# Patient Record
Sex: FEMALE | Race: WHITE | NOT HISPANIC OR LATINO | URBAN - METROPOLITAN AREA
[De-identification: names, ages, dates, MRNs, and addresses within clinical notes are randomized per-mention and may not be internally consistent; named-entity substitution may affect disease eponyms.]

---

## 2017-11-15 ENCOUNTER — OFFICE (OUTPATIENT)
Dept: URBAN - METROPOLITAN AREA CLINIC 10 | Facility: CLINIC | Age: 77
End: 2017-11-15
Payer: MEDICAID

## 2017-11-15 VITALS
SYSTOLIC BLOOD PRESSURE: 136 MMHG | DIASTOLIC BLOOD PRESSURE: 53 MMHG | WEIGHT: 245 LBS | HEART RATE: 64 BPM | HEIGHT: 65 IN

## 2017-11-15 DIAGNOSIS — R68.81 EARLY SATIETY: ICD-10-CM

## 2017-11-15 DIAGNOSIS — R10.13 EPIGASTRIC PAIN: ICD-10-CM

## 2017-11-15 DIAGNOSIS — R11.0 NAUSEA: ICD-10-CM

## 2017-11-15 DIAGNOSIS — R11.10 VOMITING, UNSPECIFIED: ICD-10-CM

## 2017-11-15 DIAGNOSIS — R10.84 GENERALIZED ABDOMINAL PAIN: ICD-10-CM

## 2017-11-15 PROCEDURE — 99204 OFFICE O/P NEW MOD 45 MIN: CPT | Performed by: INTERNAL MEDICINE

## 2017-11-15 PROCEDURE — G8427 DOCREV CUR MEDS BY ELIG CLIN: HCPCS | Performed by: INTERNAL MEDICINE

## 2017-11-15 RX ORDER — ONDANSETRON 4 MG/1
TABLET, ORALLY DISINTEGRATING ORAL
Qty: 30 | Refills: 1 | Status: ACTIVE
Start: 2017-11-15

## 2017-11-15 NOTE — SERVICENOTES
Explain given her recent normal endoscopy and her poor overall medical condition would not consider her a very good candidate for endoscopy at this point.  Will obtain records from [place].  Based on records from [place] will consider gastric emptying scan versus esophagram with upper GI.

## 2017-11-15 NOTE — SERVICEHPINOTES
Ms. Rey is 77 years old. She is her in consultation with Dr. Oliveros with chief complaint of nausea and vomiting that has been for 6 months. She describes her "stomach beign sore" as she point to he epigastric area. She describes having similar symptoms about one year ago with a negative workup in [place] about one year ago. She describes a 25 pound weight loss over the past 2 months. She describes her epigastric pain as twisting that will lead to nausea followed by emesis that can be what she as eaten several hours ago. She describes post-prandial nausea. She describes nausea prior to eating as well. She also complains of pre-syncope with standing. She describes falling at home a great deal due to the pre-syncope. She has a history of colon cancer s/p resection that was 30 years ago. Her last colonoscopy was 3 years ago. She states she is taking reglan and has done so for several years however, it is not one of her medicines she brought with her. She takes lortab daily. She is also with a pain patch.  She is wearing oxygen and in a wheel chair. She is wearing O2 2/2 COPD. She describes periodic jerking movement in her arms. She describes 2 pillow orthopnea. She describes early satiety. She has a history of CHF and DM. She describes A1c as being 6 at last check. She thinks she has peripheral neuropathy. Review of records from Dr. Oliveros:BR-10/2017-CTA: No renal artery stenosis, widely patent abdominal aorta with no aneurysmal dilation or stenosisBR-appears she had extensive GI workup approximately 1 year ago (09/2016) with EGD and Bravo exam. Manometry as well as gastric emptying scan.BR-she is on a PPI b.i.d.

## 2018-02-06 ENCOUNTER — OFFICE (OUTPATIENT)
Dept: URBAN - METROPOLITAN AREA CLINIC 10 | Facility: CLINIC | Age: 78
End: 2018-02-06
Payer: MEDICAID

## 2018-02-06 VITALS
WEIGHT: 236 LBS | HEIGHT: 65 IN | DIASTOLIC BLOOD PRESSURE: 52 MMHG | SYSTOLIC BLOOD PRESSURE: 130 MMHG | HEART RATE: 73 BPM

## 2018-02-06 DIAGNOSIS — K59.00 CONSTIPATION, UNSPECIFIED: ICD-10-CM

## 2018-02-06 DIAGNOSIS — R19.7 DIARRHEA, UNSPECIFIED: ICD-10-CM

## 2018-02-06 DIAGNOSIS — R11.10 VOMITING, UNSPECIFIED: ICD-10-CM

## 2018-02-06 DIAGNOSIS — R11.0 NAUSEA: ICD-10-CM

## 2018-02-06 DIAGNOSIS — R10.13 EPIGASTRIC PAIN: ICD-10-CM

## 2018-02-06 DIAGNOSIS — R68.81 EARLY SATIETY: ICD-10-CM

## 2018-02-06 LAB
B-TYPE NATRIURETIC PEPTIDE: 171 PG/ML — HIGH (ref 0–100)
BASIC METABOLIC PANEL (8): BUN/CREATININE RATIO: 22 (ref 12–28)
BASIC METABOLIC PANEL (8): BUN: 39 MG/DL — HIGH (ref 8–27)
BASIC METABOLIC PANEL (8): CALCIUM, SERUM: 9.8 MG/DL (ref 8.7–10.3)
BASIC METABOLIC PANEL (8): CARBON DIOXIDE, TOTAL: 28 MMOL/L (ref 18–29)
BASIC METABOLIC PANEL (8): CHLORIDE, SERUM: 102 MMOL/L (ref 96–106)
BASIC METABOLIC PANEL (8): CREATININE, SERUM: 1.74 MG/DL — HIGH (ref 0.57–1)
BASIC METABOLIC PANEL (8): EGFR IF AFRICN AM: 32 ML/MIN/1.73 — LOW (ref 59–?)
BASIC METABOLIC PANEL (8): EGFR IF NONAFRICN AM: 28 ML/MIN/1.73 — LOW (ref 59–?)
BASIC METABOLIC PANEL (8): GLUCOSE, SERUM: 164 MG/DL — HIGH (ref 65–99)
BASIC METABOLIC PANEL (8): POTASSIUM, SERUM: 5 MMOL/L (ref 3.5–5.2)
BASIC METABOLIC PANEL (8): SODIUM, SERUM: 145 MMOL/L — HIGH (ref 134–144)
CBC, PLATELET, NO DIFFERENTIAL: HEMATOCRIT: 33.3 % — LOW (ref 34–46.6)
CBC, PLATELET, NO DIFFERENTIAL: HEMOGLOBIN: 10.9 G/DL — LOW (ref 11.1–15.9)
CBC, PLATELET, NO DIFFERENTIAL: MCH: 31 PG (ref 26.6–33)
CBC, PLATELET, NO DIFFERENTIAL: MCHC: 32.7 G/DL (ref 31.5–35.7)
CBC, PLATELET, NO DIFFERENTIAL: MCV: 95 FL (ref 79–97)
CBC, PLATELET, NO DIFFERENTIAL: PLATELETS: 291 X10E3/UL (ref 150–379)
CBC, PLATELET, NO DIFFERENTIAL: RBC: 3.52 X10E6/UL — LOW (ref 3.77–5.28)
CBC, PLATELET, NO DIFFERENTIAL: RDW: 13.2 % (ref 12.3–15.4)
CBC, PLATELET, NO DIFFERENTIAL: WBC: 5.6 X10E3/UL (ref 3.4–10.8)
CORTISOL - AM: 8.5 UG/DL (ref 6.2–19.4)
FE+TIBC+FER: FERRITIN, SERUM: 122 NG/ML (ref 15–150)
FE+TIBC+FER: IRON BIND.CAP.(TIBC): 427 UG/DL (ref 250–450)
FE+TIBC+FER: IRON SATURATION: 20 % (ref 15–55)
FE+TIBC+FER: IRON, SERUM: 85 UG/DL (ref 27–139)
FE+TIBC+FER: UIBC: 342 UG/DL (ref 118–369)
HEMOGLOBIN A1C: 6.1 % — HIGH (ref 4.8–5.6)

## 2018-02-06 PROCEDURE — 99214 OFFICE O/P EST MOD 30 MIN: CPT | Performed by: INTERNAL MEDICINE

## 2018-02-06 RX ORDER — SUCRALFATE 1 G/1
3 TABLET ORAL
Refills: 0 | Status: COMPLETED
End: 2018-02-06

## 2018-02-06 RX ORDER — RANITIDINE 150 MG/1
150 TABLET ORAL
Refills: 8 | Status: COMPLETED
End: 2018-02-06

## 2018-02-06 RX ORDER — METOCLOPRAMIDE 10 MG/1
10 TABLET ORAL
Refills: 0 | Status: COMPLETED
End: 2018-02-06

## 2018-02-06 NOTE — SERVICEHPINOTES
Ms. Rey is 77 years old. She is here for follow up of nausea with infrequent vomiting. She also complains epigastric pain. She denies taking reglan although, it is on her medication list. She complains of jerking of her limbs. She complains of MARTELL where she gets SOB with going to restroom and has to wear O2 "24/7". She has improvement in nausea with zofran.  She still describes bouts of postprandial nausea with epigastric soreness.  She denies much in the way vomiting. She does complain of alternating diarrhea and constipation. She takes Lortab 2-3 times daily.She has a history of CHF, COPD and DM. She thinks she has peripheral neuropathy. Review of records from Dr. Oliveros:BR-11/20/2017-creatinine 1.6BR-AST 21/ALT 29/total bili 0.4/alk-phos 63/albumin 3.2BR-TSH normal at 1.29BR-hematocrit 30/MCV 90/platelets 265Review of records from Dr. Oliveros:BR-10/2017-CTA: No renal artery stenosis, widely patent abdominal aorta with no aneurysmal dilation or stenosisBR-appears she had extensive GI workup approximately 1 year ago (09/2016) with EGD and Bravo exam. Manometry as well as gastric emptying scan.BR-she is on a PPI b.i.d.Review of records from previous gastroenterologist:BR-09/2016-EGD for dysphagia: Irregular Z-line at 37 cm. Esophagus otherwise normal. Duodenum normal. Negative random esophageal biopsies. Stomach normal.BR-10/2016- esophageal manometry: Elevated mean lower esophageal sphincter pressure with normal relaxation. 20% failed peristalsis swallows. 20% premature contractions but normal mean amplitude of contractions with normal distal latency. PH study positive for acid reflux with a total DeMeester score 14.8 without statistical significant symptom association for acid refluxBR-07/2015-colonoscopy: External hemorrhoids, sigmoid diverticulosis, patent end-to-side ileocolonic anastomosis with ulceration.BR-06/2011-EGD: Irregular Z-line, gastritis, normal duodenum.BR-pH study: Positive for acid reflux on day 1 with a DeMeester of 20.1 on day 1 and 1.3 a day 2 with an overall DeMeester 12.1.